# Patient Record
Sex: FEMALE | Race: WHITE | NOT HISPANIC OR LATINO | URBAN - METROPOLITAN AREA
[De-identification: names, ages, dates, MRNs, and addresses within clinical notes are randomized per-mention and may not be internally consistent; named-entity substitution may affect disease eponyms.]

---

## 2017-01-11 ENCOUNTER — INPATIENT (INPATIENT)
Facility: HOSPITAL | Age: 82
LOS: 4 days | Discharge: OTHER INSTITUTION | DRG: 563 | End: 2017-01-16
Attending: ORTHOPAEDIC SURGERY | Admitting: ORTHOPAEDIC SURGERY
Payer: MEDICARE

## 2017-01-11 VITALS
TEMPERATURE: 99 F | OXYGEN SATURATION: 98 % | DIASTOLIC BLOOD PRESSURE: 113 MMHG | SYSTOLIC BLOOD PRESSURE: 179 MMHG | HEART RATE: 98 BPM | RESPIRATION RATE: 16 BRPM

## 2017-01-11 LAB
ALBUMIN SERPL ELPH-MCNC: 3.5 G/DL — SIGNIFICANT CHANGE UP (ref 3.4–5)
ALP SERPL-CCNC: 81 U/L — SIGNIFICANT CHANGE UP (ref 40–120)
ALT FLD-CCNC: 26 U/L — SIGNIFICANT CHANGE UP (ref 12–42)
ANION GAP SERPL CALC-SCNC: 8 MMOL/L — LOW (ref 9–16)
APPEARANCE UR: CLEAR — SIGNIFICANT CHANGE UP
APTT BLD: 27.6 SEC — SIGNIFICANT CHANGE UP (ref 27.5–37.4)
AST SERPL-CCNC: 26 U/L — SIGNIFICANT CHANGE UP (ref 15–37)
BASOPHILS NFR BLD AUTO: 0.4 % — SIGNIFICANT CHANGE UP (ref 0–2)
BILIRUB SERPL-MCNC: 0.5 MG/DL — SIGNIFICANT CHANGE UP (ref 0.2–1.2)
BILIRUB UR-MCNC: (no result)
BLD GP AB SCN SERPL QL: NEGATIVE — SIGNIFICANT CHANGE UP
BLD GP AB SCN SERPL QL: NEGATIVE — SIGNIFICANT CHANGE UP
BUN SERPL-MCNC: 15 MG/DL — SIGNIFICANT CHANGE UP (ref 7–23)
CALCIUM SERPL-MCNC: 9.2 MG/DL — SIGNIFICANT CHANGE UP (ref 8.5–10.5)
CHLORIDE SERPL-SCNC: 104 MMOL/L — SIGNIFICANT CHANGE UP (ref 96–108)
CO2 SERPL-SCNC: 28 MMOL/L — SIGNIFICANT CHANGE UP (ref 22–31)
COLOR SPEC: YELLOW — SIGNIFICANT CHANGE UP
CREAT SERPL-MCNC: 0.84 MG/DL — SIGNIFICANT CHANGE UP (ref 0.5–1.3)
DIFF PNL FLD: NEGATIVE — SIGNIFICANT CHANGE UP
EOSINOPHIL NFR BLD AUTO: 0.4 % — SIGNIFICANT CHANGE UP (ref 0–6)
GLUCOSE SERPL-MCNC: 107 MG/DL — HIGH (ref 70–99)
GLUCOSE UR QL: NEGATIVE — SIGNIFICANT CHANGE UP
HCT VFR BLD CALC: 40.3 % — SIGNIFICANT CHANGE UP (ref 34.5–45)
HGB BLD-MCNC: 13.9 G/DL — SIGNIFICANT CHANGE UP (ref 11.5–15.5)
INR BLD: 0.99 — SIGNIFICANT CHANGE UP (ref 0.88–1.16)
KETONES UR-MCNC: (no result) MG/DL
LEUKOCYTE ESTERASE UR-ACNC: NEGATIVE — SIGNIFICANT CHANGE UP
LYMPHOCYTES # BLD AUTO: 9.2 % — LOW (ref 13–44)
MCHC RBC-ENTMCNC: 34.2 PG — HIGH (ref 27–34)
MCHC RBC-ENTMCNC: 34.5 G/DL — SIGNIFICANT CHANGE UP (ref 32–36)
MCV RBC AUTO: 99 FL — SIGNIFICANT CHANGE UP (ref 80–100)
MONOCYTES NFR BLD AUTO: 8.5 % — SIGNIFICANT CHANGE UP (ref 2–14)
NEUTROPHILS NFR BLD AUTO: 81.5 % — HIGH (ref 43–77)
NITRITE UR-MCNC: NEGATIVE — SIGNIFICANT CHANGE UP
PH UR: 6 — SIGNIFICANT CHANGE UP (ref 4–8)
PLATELET # BLD AUTO: 293 K/UL — SIGNIFICANT CHANGE UP (ref 150–400)
POTASSIUM SERPL-MCNC: 4.4 MMOL/L — SIGNIFICANT CHANGE UP (ref 3.5–5.3)
POTASSIUM SERPL-SCNC: 4.4 MMOL/L — SIGNIFICANT CHANGE UP (ref 3.5–5.3)
PROT SERPL-MCNC: 6.9 G/DL — SIGNIFICANT CHANGE UP (ref 6.4–8.2)
PROT UR-MCNC: NEGATIVE MG/DL — SIGNIFICANT CHANGE UP
PROTHROM AB SERPL-ACNC: 11 SEC — SIGNIFICANT CHANGE UP (ref 10–13.1)
RBC # BLD: 4.07 M/UL — SIGNIFICANT CHANGE UP (ref 3.8–5.2)
RBC # FLD: 11.5 % — SIGNIFICANT CHANGE UP (ref 10.3–16.9)
RH IG SCN BLD-IMP: NEGATIVE — SIGNIFICANT CHANGE UP
RH IG SCN BLD-IMP: NEGATIVE — SIGNIFICANT CHANGE UP
SODIUM SERPL-SCNC: 140 MMOL/L — SIGNIFICANT CHANGE UP (ref 135–145)
SP GR SPEC: 1.02 — SIGNIFICANT CHANGE UP (ref 1–1.03)
TROPONIN I SERPL-MCNC: <0.015 NG/ML — SIGNIFICANT CHANGE UP (ref 0.01–0.04)
UROBILINOGEN FLD QL: 0.2 E.U./DL — SIGNIFICANT CHANGE UP
WBC # BLD: 11 K/UL — HIGH (ref 3.8–10.5)
WBC # FLD AUTO: 11 K/UL — HIGH (ref 3.8–10.5)

## 2017-01-11 PROCEDURE — 93010 ELECTROCARDIOGRAM REPORT: CPT

## 2017-01-11 PROCEDURE — 99285 EMERGENCY DEPT VISIT HI MDM: CPT | Mod: 25

## 2017-01-11 PROCEDURE — 73140 X-RAY EXAM OF FINGER(S): CPT | Mod: 26,76,59

## 2017-01-11 PROCEDURE — 70486 CT MAXILLOFACIAL W/O DYE: CPT | Mod: 26

## 2017-01-11 PROCEDURE — 73110 X-RAY EXAM OF WRIST: CPT | Mod: 26,50

## 2017-01-11 PROCEDURE — 72125 CT NECK SPINE W/O DYE: CPT | Mod: 26

## 2017-01-11 PROCEDURE — 70450 CT HEAD/BRAIN W/O DYE: CPT | Mod: 26

## 2017-01-11 PROCEDURE — 73130 X-RAY EXAM OF HAND: CPT | Mod: 26,LT

## 2017-01-11 RX ORDER — IBUPROFEN 200 MG
600 TABLET ORAL ONCE
Qty: 0 | Refills: 0 | Status: COMPLETED | OUTPATIENT
Start: 2017-01-11 | End: 2017-01-11

## 2017-01-11 RX ORDER — TETANUS TOXOID, REDUCED DIPHTHERIA TOXOID AND ACELLULAR PERTUSSIS VACCINE, ADSORBED 5; 2.5; 8; 8; 2.5 [IU]/.5ML; [IU]/.5ML; UG/.5ML; UG/.5ML; UG/.5ML
0.5 SUSPENSION INTRAMUSCULAR ONCE
Qty: 0 | Refills: 0 | Status: COMPLETED | OUTPATIENT
Start: 2017-01-11 | End: 2017-01-11

## 2017-01-11 RX ORDER — ACETAMINOPHEN 500 MG
650 TABLET ORAL ONCE
Qty: 0 | Refills: 0 | Status: COMPLETED | OUTPATIENT
Start: 2017-01-11 | End: 2017-01-11

## 2017-01-11 RX ADMIN — Medication 650 MILLIGRAM(S): at 18:30

## 2017-01-11 RX ADMIN — Medication 600 MILLIGRAM(S): at 20:38

## 2017-01-11 RX ADMIN — Medication 650 MILLIGRAM(S): at 17:49

## 2017-01-11 RX ADMIN — TETANUS TOXOID, REDUCED DIPHTHERIA TOXOID AND ACELLULAR PERTUSSIS VACCINE, ADSORBED 0.5 MILLILITER(S): 5; 2.5; 8; 8; 2.5 SUSPENSION INTRAMUSCULAR at 19:50

## 2017-01-11 RX ADMIN — Medication 600 MILLIGRAM(S): at 19:50

## 2017-01-11 NOTE — ED PROVIDER NOTE - MEDICAL DECISION MAKING DETAILS
86yo F hx of dementia, here with bilateral non displaced distal radius fx, mandibular fx. Family at bedside, no concerns for caring for her at home if she is discharged. labs sent including troponin, ua, ekg to make sure no other reason for fall. per family, pt at baseline.

## 2017-01-11 NOTE — ED ADULT TRIAGE NOTE - CHIEF COMPLAINT QUOTE
Pt c/o pain to the chin and wrists bilaterally. Laceration to the chi approximately 2 cm in length. No active bleeding. cap refill 1 second. pain worsens

## 2017-01-11 NOTE — ED PROVIDER NOTE - OBJECTIVE STATEMENT
86yo F hx of alzheimers dementia, former alcoholism, here with complaint of fall today outside. FOOSH onto bilateral hands. Complaining of bilateral wrist pain, and L middle finger pain. Denies LOC. Hit chin and has laceration. However poor historian as after I left, pt already forgot that I had seen her.   Lives with a  who cares for her. Family not concerned about watching her after the fall.   Pts son and son in law came to bedside afterwards. Confirmed that pt is at her usual baseline. No concern for her drinking today. Deny hx of withdrawal. Do not think she is on any medications but will find out.

## 2017-01-11 NOTE — ED PROVIDER NOTE - ENMT, MLM
Airway patent, Nasal mucosa clear. Mouth with normal mucosa. Throat has no vesicles, no oropharyngeal exudates and uvula is midline. +1cm laceration to chin, no active bleeding, but gaping.

## 2017-01-12 PROCEDURE — 73110 X-RAY EXAM OF WRIST: CPT | Mod: 26,50

## 2017-01-12 RX ORDER — ACETAMINOPHEN 500 MG
650 TABLET ORAL EVERY 6 HOURS
Qty: 0 | Refills: 0 | Status: DISCONTINUED | OUTPATIENT
Start: 2017-01-12 | End: 2017-01-16

## 2017-01-12 RX ORDER — MEMANTINE HYDROCHLORIDE 10 MG/1
0 TABLET ORAL
Qty: 0 | Refills: 0 | COMMUNITY

## 2017-01-12 RX ORDER — TRAMADOL HYDROCHLORIDE 50 MG/1
50 TABLET ORAL EVERY 4 HOURS
Qty: 0 | Refills: 0 | Status: DISCONTINUED | OUTPATIENT
Start: 2017-01-12 | End: 2017-01-16

## 2017-01-12 RX ORDER — SODIUM CHLORIDE 9 MG/ML
1000 INJECTION, SOLUTION INTRAVENOUS
Qty: 0 | Refills: 0 | Status: DISCONTINUED | OUTPATIENT
Start: 2017-01-12 | End: 2017-01-16

## 2017-01-12 RX ORDER — FAMOTIDINE 10 MG/ML
20 INJECTION INTRAVENOUS EVERY 12 HOURS
Qty: 0 | Refills: 0 | Status: DISCONTINUED | OUTPATIENT
Start: 2017-01-12 | End: 2017-01-12

## 2017-01-12 RX ORDER — METHYLPHENIDATE HCL 5 MG
0 TABLET ORAL
Qty: 0 | Refills: 0 | COMMUNITY

## 2017-01-12 RX ORDER — DOCUSATE SODIUM 100 MG
100 CAPSULE ORAL
Qty: 0 | Refills: 0 | Status: DISCONTINUED | OUTPATIENT
Start: 2017-01-12 | End: 2017-01-16

## 2017-01-12 RX ADMIN — TRAMADOL HYDROCHLORIDE 50 MILLIGRAM(S): 50 TABLET ORAL at 18:30

## 2017-01-12 RX ADMIN — TRAMADOL HYDROCHLORIDE 50 MILLIGRAM(S): 50 TABLET ORAL at 11:00

## 2017-01-12 RX ADMIN — TRAMADOL HYDROCHLORIDE 50 MILLIGRAM(S): 50 TABLET ORAL at 23:07

## 2017-01-12 RX ADMIN — Medication 100 MILLIGRAM(S): at 07:52

## 2017-01-12 RX ADMIN — TRAMADOL HYDROCHLORIDE 50 MILLIGRAM(S): 50 TABLET ORAL at 10:38

## 2017-01-12 RX ADMIN — Medication 100 MILLIGRAM(S): at 18:01

## 2017-01-12 RX ADMIN — Medication 650 MILLIGRAM(S): at 08:25

## 2017-01-12 RX ADMIN — TRAMADOL HYDROCHLORIDE 50 MILLIGRAM(S): 50 TABLET ORAL at 15:00

## 2017-01-12 RX ADMIN — Medication 650 MILLIGRAM(S): at 07:52

## 2017-01-12 RX ADMIN — TRAMADOL HYDROCHLORIDE 50 MILLIGRAM(S): 50 TABLET ORAL at 14:23

## 2017-01-12 RX ADMIN — TRAMADOL HYDROCHLORIDE 50 MILLIGRAM(S): 50 TABLET ORAL at 18:01

## 2017-01-12 NOTE — DISCHARGE NOTE ADULT - PATIENT PORTAL LINK FT
“You can access the Follow[facility] Patient Portal, offered by [facility], by registering with the following website: [email or web address]”

## 2017-01-12 NOTE — DISCHARGE NOTE ADULT - CARE PROVIDERS DIRECT ADDRESSES
,DirectAddress_Unknown,[email or web address]

## 2017-01-12 NOTE — H&P ADULT. - HISTORY OF PRESENT ILLNESS
85F with h/o alzheimer's dementia s/p FOOSH onto b/l wrists. She denies any injury to her head, neck, spine. She denies any LOC/headache/blurry vision. She has no numbness/tingling in her involved extremity. She is accompanied by her family who [place] with [place]/boarder. She has moderate stage AD. She has no history of syncope or seizures.    Pt also has chin laceration as well and mandible fracture. Chin laceration was sutured by ED attending. ENT saw mandible fracture in ER and recommended outpt follow-up.

## 2017-01-12 NOTE — DISCHARGE NOTE ADULT - ADDITIONAL INSTRUCTIONS
Follow up with your primary care provider and Ear, Nose and Throat team for outpatient follow up of non-operative mandible fracture.

## 2017-01-12 NOTE — CONSULT NOTE ADULT - SUBJECTIVE AND OBJECTIVE BOX
85F with h/o alzheimer's dementia s/p FOOSH after tripping on the sidewalk onto b/l wrists. She denies any injury to her head, neck, spine. She denies any LOC/headache/blurry vision. She has no numbness/tingling in her involved extremity. She is accompanied by her family who [place] the [place] with an [place]/boarder. She has moderate stage AD. She has no history of syncope or seizures.    Pt also has chin laceration as well and mandible fracture. Chin laceration was sutured by ED attending. ENT saw mandible fracture in ER and recommended outpt follow-up.      Allergies/Medications:   Allergies:        Allergies:  	No Known Allergies:     Home Medications:   * Patient Currently Takes Medications as of 12-Jan-2017 00:42 documented in Prescription Writer  · 	donepezil: Last Dose Taken:  ,  orally   · 	memantine: Last Dose Taken:  ,  orally   · 	methylphenidate: Last Dose Taken:  ,  orally     . .    PMH/PSH/FH/SH:   Past Medical History:  Alzheimer's dementia without behavioral disturbance, unspecified timing of dementia onset.    Social History:  · Occupation	Retired	  · Lives With	alone	    Substance Use History:  · Substance Use	never used	    Alcohol Use History:  · Have you ever consumed alcohol	yes...	  · Alcohol Frequency	2-4 times/mo	  · Alcohol Amount	1-2 drinks	  · Alcohol Use Comment		    PE: Afebrile, vital signs stable  Awake, alert, comfortable, pleasant  RUE: Swelling and ecchymosis dorsal wrist  TTP over dorsal wrist, mild TTP snuffbox, no TTP fovea  Mild pain with Sabillon's maneuver, no instability  ROM: WE: 30, WF: 20  5/5 AIN/PIN/Uln  Sensation intact Med/Rad/Uln    LUE: Middle finger splinted, mild tenderness over the PIP joint  Mild TTP over dorsal distal radius, no TTP dorsal wrist, snuffbox, fovea  Wrist ROM: WE: 40, WF: 30  5/5 AIN/PIN/Uln  Sensation intact Med/Rad/Uln    XR Right wrist: Minimally displaced intra-articular distal radius fracture, Scapholunate widening, possibly chronic, has signs of Stage 2 SLAC wrist.      XR left wrist: Nondisplaced extra-articular distal radius fracture  XR left middle finger: PIP dorsal dislocation s/p reduction

## 2017-01-12 NOTE — PHYSICAL THERAPY INITIAL EVALUATION ADULT - GENERAL OBSERVATIONS, REHAB EVAL
Patient received supine in bed with bilateral wrist splints, heplock IV. Patient in no apparent distress.

## 2017-01-12 NOTE — DISCHARGE NOTE ADULT - PLAN OF CARE
Improvement after bilateral wrist fractures Right upper extremity is partial weight bearing Improvement after non-operative bilateral distal radius fractures Partial weight bearing on bilateral upper extremities.  Wear thumb spica splints and keep left 3rd finger splint intact.  No strenuous activity, heavy lifting, driving, tub bathing, or returning to work until cleared by MD.  Carmen rich.  Follow up with hand surgeon Dr. Holden on January 18th.   If you don't have a bowel movement by post op day 3, then take Milk of Magnesia (over the counter).  If no bowel movement by at least post op day 5, then use a Dulcolax suppository (over the counter) and/or a Fleets enema--if still no bowel movement, call your MD.  Contact your doctor if you experience: fever greater than 101.5, chills, chest pain, difficulty breathing, bleeding, redness or heat around the incision.

## 2017-01-12 NOTE — DISCHARGE NOTE ADULT - CARE PROVIDER_API CALL
Magi Holden), Orthopaedic Surgery  [address] [place] [address]  Phone: [phone]  Fax: [phone]

## 2017-01-12 NOTE — DISCHARGE NOTE ADULT - HOSPITAL COURSE
Admit    Pre/post-op Antibiotics  DVT prophylaxis  Physical Therapy  Pain Management Admit1/12/17 B/L Distal Radius Fracture, L 3rd PIP dislocation  DVT prophylaxis  Physical Therapy/ occupational therapy  Pain Management

## 2017-01-12 NOTE — DISCHARGE NOTE ADULT - NS MD DC FALL RISK RISK
For information on Fall & Injury Prevention, visit [email or web address]

## 2017-01-12 NOTE — PROGRESS NOTE ADULT - SUBJECTIVE AND OBJECTIVE BOX
ORTHO NOTE    [x ] Pt seen/examined.  [x ] Pt without any complaints/in NAD.    [ ] Pt complains of:      ROS: [ ] Fever  [ ] Chills  [ ] CP [ ] SOB [ ] Dysnea  [ ] Palpitations [ ] Cough [ ] N/V/C/D [ ] Paresthia [ ] Other     [x ] ROS  otherwise negative    .    PHYSICAL EXAM:    Vital Signs Last 24 Hrs  T(C): 37.4, Max: 37.4 (01-12 @ 14:54)  T(F): 99.3, Max: 99.3 (01-12 @ 14:54)  HR: 81 (73 - 86)  BP: 129/82 (126/77 - 169/93)  BP(mean): --  RR: 16 (16 - 18)  SpO2: 96% (96% - 99%)    I&O's Detail       CAPILLARY BLOOD GLUCOSE                  Neuro: baseline dementia    Lungs: CTA    CV: WNL    ABD: +BS    Ext: wrist supports to B/l hands splint to left     LABS                        13.9   11.0  )-----------( 293      ( 11 Jan 2017 20:04 )             40.3                              PT/INR - ( 11 Jan 2017 20:04 )   PT: 11.0 sec;   INR: 0.99          PTT - ( 11 Jan 2017 20:04 )  PTT:27.6 sec  11 Jan 2017 20:04    140    |  104    |  15     ----------------------------<  107    4.4     |  28     |  0.84     Ca    9.2        11 Jan 2017 20:04    TPro  6.9    /  Alb  3.5    /  TBili  0.5    /  DBili  x      /  AST  26     /  ALT  26     /  AlkPhos  81     11 Jan 2017 20:04      [ ] Other Labs  [ ] None ordered            Please check or [place] when present:  •  Heart Failure:    [ ] Acute        [ ]  Acute on Chronic        [ ] Chronic         [ ] Diastolic     [ ]  Combined    •  TRAVIS:     [ ] ATN        [ ]  Renal medullary necrosis       [ ]  Renal cortical necrosis                  [ ] Other pathological Lesion:  •  CKD:  [ ] Stage I   [ ] Stage II  [ ] Stage III    [ ]Stage IV   [ ]  CKD V   [ ]  Other/Unspecified:    •  Abdominal Nutritional Status:   [ ] Malnutrition-See Nutrition note    [ ] Cachexia   [ ]  Other        [ ] Supplement ordered:            [ ] Morbid Obesity: BMI >=40         ASSESSMENT/PLAN:      STATUS POST: b/l wrist fx    CONTINUE:          [x ] PT    [x ] DVT PPX-scds    [ x] Pain Mgt     [s ] Dispo plan- [facility]    [x ] Pt seen/examined.  [x ] Pt without any complaints/in NAD.    [ ] Pt complains of:      ROS: [ ] Fever  [ ] Chills  [ ] CP [ ] SOB [ ] Dysnea  [ ] Palpitations [ ] Cough [ ] N/V/C/D [ ] Paresthia [ ] Other     [x ] ROS  otherwise negative    .    PHYSICAL EXAM:    Vital Signs Last 24 Hrs  T(C): 37.4, Max: 37.4 (01-12 @ 14:54)  T(F): 99.3, Max: 99.3 (01-12 @ 14:54)  HR: 81 (73 - 86)  BP: 129/82 (126/77 - 169/93)  BP(mean): --  RR: 16 (16 - 18)  SpO2: 96% (96% - 99%)    I&O's Detail       CAPILLARY BLOOD GLUCOSE                  Neuro: baseline dementia    Lungs: CTA    CV: WNL    ABD: +BS    Ext: wrist supports to B/l hands splint to left middle finger    LABS                        13.9   11.0  )-----------( 293      ( 11 Jan 2017 20:04 )             40.3                              PT/INR - ( 11 Jan 2017 20:04 )   PT: 11.0 sec;   INR: 0.99          PTT - ( 11 Jan 2017 20:04 )  PTT:27.6 sec  11 Jan 2017 20:04    140    |  104    |  15     ----------------------------<  107    4.4     |  28     |  0.84     Ca    9.2        11 Jan 2017 20:04    TPro  6.9    /  Alb  3.5    /  TBili  0.5    /  DBili  x      /  AST  26     /  ALT  26     /  AlkPhos  81     11 Jan 2017 20:04      [ ] Other Labs  [ ] None ordered            Please check or [place] when present:  •  Heart Failure:    [ ] Acute        [ ]  Acute on Chronic        [ ] Chronic         [ ] Diastolic     [ ]  Combined    •  TRAVIS:     [ ] ATN        [ ]  Renal medullary necrosis       [ ]  Renal cortical necrosis                  [ ] Other pathological Lesion:  •  CKD:  [ ] Stage I   [ ] Stage II  [ ] Stage III    [ ]Stage IV   [ ]  CKD V   [ ]  Other/Unspecified:    •  Abdominal Nutritional Status:   [ ] Malnutrition-See Nutrition note    [ ] Cachexia   [ ]  Other        [ ] Supplement ordered:            [ ] Morbid Obesity: BMI >=40         ASSESSMENT/PLAN:      STATUS POST: b/l wrist fx    CONTINUE:          [x ] PT    [x ] DVT PPX-scds    [ x] Pain Mgt     [s ] Dispo plan- [facility]

## 2017-01-12 NOTE — PHYSICAL THERAPY INITIAL EVALUATION ADULT - DIAGNOSIS, PT EVAL
Patient presents with impaired bed mobility, transfers, and ambulation secondary to inability to use bilateral hands and unsteadiness.

## 2017-01-12 NOTE — H&P ADULT. - ASSESSMENT
85F RHD h/o Alzheimer's s/p B/L FOOSH p/w L 3rd PIP dislocation, B/L distal radius fractures, + R scapholunate ligament dissociation. Pt also sustained chin lac and mandible fracture (ENT consulted). No other injuries. NVID. Finger dislocation reduced with digital block and put in splint. B/L sugar tongs applied. NVID post-procedure. Non-op. Admitted for social reasons, lives alone.  1. NO plan for OR  2. Plan to admit for 85F RHD h/o Alzheimer's s/p B/L FOOSH p/w L 3rd PIP dislocation, B/L distal radius fractures, + R scapholunate ligament dissociation. Pt also sustained chin lac and mandible fracture (ENT consulted). No other injuries. NVID. Finger dislocation reduced with digital block and put in splint. B/L sugar tongs applied. NVID post-procedure. Non-op. Admitted for social reasons, lives alone.  1. NO plan for OR  2. Plan to admit for rehab v. home placement  3. pain control  4. dvt ppx  5. ENT f/u as outpatient in 1 week  6. Dr. Holden consulted f/u Dr Holden recs

## 2017-01-12 NOTE — PHYSICAL THERAPY INITIAL EVALUATION ADULT - ADDITIONAL COMMENTS
Patient states that prior to fall, she was independent with all mobility; lives alone in a one level apartment with no steps. Daughter-in-law states that patient has assistance to manage medications.

## 2017-01-12 NOTE — CONSULT NOTE ADULT - ASSESSMENT
85 year old female with Alzheimer's dementia with right distal radius fracture intra-articular with minimal displacement, SL widening possibly chronic versus new injury, left distal radius nondisplaced fracture, middle finger PIP dorsal dislocation s/p reduction  -bilateral thumb spica splints, limit weight bearing if possible  -Left middle finger splint, will convert to kandis tapes next week.  -Follow up in my office on Wednesday, 1/18/2017  -Recommend [facility] rehab to help with ADLs

## 2017-01-12 NOTE — H&P ADULT. - ADDITIONAL PE
Chin laceration now sutured  L 3rd PIP grossly deformed, ++swelling, ++ecchymosis no open wounds.   B/L wrists TTP. B/L UE: SILT M/U/R, Motor AIN/PIN/U Intact Radial 2+ Ext WWP, Brisk Cap Refill

## 2017-01-12 NOTE — PHYSICAL THERAPY INITIAL EVALUATION ADULT - PERTINENT HX OF CURRENT PROBLEM, REHAB EVAL
Patient is an 85 year old female who presents after trip and fall at home with resultant bilateral distal radius fractures and mandible fracture.

## 2017-01-12 NOTE — PHYSICAL THERAPY INITIAL EVALUATION ADULT - ACTIVE RANGE OF MOTION EXAMINATION, REHAB EVAL
bilateral  lower extremity Active ROM was WFL (within functional limits)/Able to lift bilateral shoulders to 90; elbow flexion WNL.

## 2017-01-12 NOTE — DISCHARGE NOTE ADULT - CARE PLAN
Principal Discharge DX:	Distal radius fracture  Goal:	Improvement after bilateral wrist fractures  Instructions for follow-up, activity and diet:	Right upper extremity is partial weight bearing Principal Discharge DX:	Distal radius fracture  Goal:	Improvement after non-operative bilateral distal radius fractures  Instructions for follow-up, activity and diet:	Partial weight bearing on bilateral upper extremities.  Wear thumb spica splints and keep left 3rd finger splint intact.  No strenuous activity, heavy lifting, driving, tub bathing, or returning to work until cleared by MD.  Carmen rich.  Follow up with hand surgeon Dr. Holden on January 18th.   If you don't have a bowel movement by post op day 3, then take Milk of Magnesia (over the counter).  If no bowel movement by at least post op day 5, then use a Dulcolax suppository (over the counter) and/or a Fleets enema--if still no bowel movement, call your MD.  Contact your doctor if you experience: fever greater than 101.5, chills, chest pain, difficulty breathing, bleeding, redness or heat around the incision.

## 2017-01-13 LAB
CULTURE RESULTS: NO GROWTH — SIGNIFICANT CHANGE UP
SPECIMEN SOURCE: SIGNIFICANT CHANGE UP

## 2017-01-13 RX ORDER — TRAMADOL HYDROCHLORIDE 50 MG/1
1 TABLET ORAL
Qty: 0 | Refills: 0 | COMMUNITY
Start: 2017-01-13

## 2017-01-13 RX ORDER — DOCUSATE SODIUM 100 MG
1 CAPSULE ORAL
Qty: 0 | Refills: 0 | COMMUNITY
Start: 2017-01-13

## 2017-01-13 RX ORDER — DONEPEZIL HYDROCHLORIDE 10 MG/1
10 TABLET, FILM COATED ORAL AT BEDTIME
Qty: 0 | Refills: 0 | Status: DISCONTINUED | OUTPATIENT
Start: 2017-01-13 | End: 2017-01-16

## 2017-01-13 RX ORDER — DONEPEZIL HYDROCHLORIDE 10 MG/1
1 TABLET, FILM COATED ORAL
Qty: 0 | Refills: 0 | COMMUNITY
Start: 2017-01-13

## 2017-01-13 RX ORDER — SENNA PLUS 8.6 MG/1
2 TABLET ORAL
Qty: 0 | Refills: 0 | COMMUNITY
Start: 2017-01-13

## 2017-01-13 RX ORDER — SENNA PLUS 8.6 MG/1
2 TABLET ORAL AT BEDTIME
Qty: 0 | Refills: 0 | Status: DISCONTINUED | OUTPATIENT
Start: 2017-01-13 | End: 2017-01-16

## 2017-01-13 RX ORDER — ACETAMINOPHEN 500 MG
2 TABLET ORAL
Qty: 0 | Refills: 0 | COMMUNITY
Start: 2017-01-13

## 2017-01-13 RX ORDER — MAGNESIUM HYDROXIDE 400 MG/1
30 TABLET, CHEWABLE ORAL DAILY
Qty: 0 | Refills: 0 | Status: DISCONTINUED | OUTPATIENT
Start: 2017-01-13 | End: 2017-01-16

## 2017-01-13 RX ADMIN — Medication 650 MILLIGRAM(S): at 17:15

## 2017-01-13 RX ADMIN — TRAMADOL HYDROCHLORIDE 50 MILLIGRAM(S): 50 TABLET ORAL at 00:00

## 2017-01-13 RX ADMIN — DONEPEZIL HYDROCHLORIDE 10 MILLIGRAM(S): 10 TABLET, FILM COATED ORAL at 21:27

## 2017-01-13 RX ADMIN — SENNA PLUS 2 TABLET(S): 8.6 TABLET ORAL at 21:27

## 2017-01-13 RX ADMIN — Medication 5 MILLIGRAM(S): at 21:27

## 2017-01-13 RX ADMIN — Medication 100 MILLIGRAM(S): at 17:15

## 2017-01-13 NOTE — OCCUPATIONAL THERAPY INITIAL EVALUATION ADULT - MANUAL MUSCLE TESTING RESULTS, REHAB EVAL
NWB bilateral upper extremities; bilateral shoulder/elbow > 3/5, wrist NT, digits 3-/5 bilaterally/grossly assessed due to

## 2017-01-13 NOTE — PROGRESS NOTE ADULT - SUBJECTIVE AND OBJECTIVE BOX
ORTHO NOTE    [ x] Pt seen/examined.  [x ] Pt without any complaints/in NAD.    [ ] Pt complains of:      ROS: [ ] Fever  [ ] Chills  [ ] CP [ ] SOB [ ] Dysnea  [ ] Palpitations [ ] Cough [ ] N/V/C/D [ ] Paresthia [ ] Other     [x ] ROS  otherwise negative    .    PHYSICAL EXAM:    Vital Signs Last 24 Hrs  T(C): 37.2, Max: 37.6 (01-12 @ 17:14)  T(F): 98.9, Max: 99.6 (01-12 @ 17:14)  HR: 86 (86 - 93)  BP: 109/66 (109/66 - 171/95)  BP(mean): --  RR: 17 (15 - 18)  SpO2: 98% (95% - 98%)    I&O's Detail       CAPILLARY BLOOD GLUCOSE                  Neuro: AAOX3    Lungs: CTA, IS demonstrated    CV:    ABD: soft, nontender    Ext: bilateral wrist splints in place; right hand edema > left hand edema; bilateral UE NVID; left 3rd digit with finger splint    LABS                        13.9   11.0  )-----------( 293      ( 11 Jan 2017 20:04 )             40.3                              PT/INR - ( 11 Jan 2017 20:04 )   PT: 11.0 sec;   INR: 0.99          PTT - ( 11 Jan 2017 20:04 )  PTT:27.6 sec  11 Jan 2017 20:04    140    |  104    |  15     ----------------------------<  107    4.4     |  28     |  0.84     Ca    9.2        11 Jan 2017 20:04    TPro  6.9    /  Alb  3.5    /  TBili  0.5    /  DBili  x      /  AST  26     /  ALT  26     /  AlkPhos  81     11 Jan 2017 20:04      [ ] Other Labs  [ ] None ordered            Please check or [place] when present:  •  Heart Failure:    [ ] Acute        [ ]  Acute on Chronic        [ ] Chronic         [ ] Diastolic     [ ]  Combined    •  TRAVIS:     [ ] ATN        [ ]  Renal medullary necrosis       [ ]  Renal cortical necrosis                  [ ] Other pathological Lesion:  •  CKD:  [ ] Stage I   [ ] Stage II  [ ] Stage III    [ ]Stage IV   [ ]  CKD V   [ ]  Other/Unspecified:    •  Abdominal Nutritional Status:   [ ] Malnutrition-See Nutrition note    [ ] Cachexia   [ ]  Other        [ ] Supplement ordered:            [ ] Morbid Obesity: BMI >=40         ASSESSMENT/PLAN:  non-op bilateral distal radius fractures and left 3rd PIP dislocation  Verified Alzheimers medications with her pharmacy  Planning for [facility] (needs one more night for Medicare)  Assistance with ADLs provided  CONTINUE:          [x ] OT- R UE PWB, L UE WBAT     [x ] DVT PPX- n/a    [x ] Pain Mgt- PO meds    [x ] Dispo plan- [facility]    [ x] Pt seen/examined.  [x ] Pt without any complaints/in NAD.    [ ] Pt complains of:      ROS: [ ] Fever  [ ] Chills  [ ] CP [ ] SOB [ ] Dysnea  [ ] Palpitations [ ] Cough [ ] N/V/C/D [ ] Paresthia [ ] Other     [x ] ROS  otherwise negative    .    PHYSICAL EXAM:    Vital Signs Last 24 Hrs  T(C): 37.2, Max: 37.6 (01-12 @ 17:14)  T(F): 98.9, Max: 99.6 (01-12 @ 17:14)  HR: 86 (86 - 93)  BP: 109/66 (109/66 - 171/95)  BP(mean): --  RR: 17 (15 - 18)  SpO2: 98% (95% - 98%)    I&O's Detail       CAPILLARY BLOOD GLUCOSE                  Neuro: AAOX3    Lungs: CTA, IS demonstrated    CV:    ABD: soft, nontender    Ext: bilateral wrist splints in place; right hand edema > left hand edema; bilateral UE NVID; left 3rd digit with finger splint    LABS                        13.9   11.0  )-----------( 293      ( 11 Jan 2017 20:04 )             40.3                              PT/INR - ( 11 Jan 2017 20:04 )   PT: 11.0 sec;   INR: 0.99          PTT - ( 11 Jan 2017 20:04 )  PTT:27.6 sec  11 Jan 2017 20:04    140    |  104    |  15     ----------------------------<  107    4.4     |  28     |  0.84     Ca    9.2        11 Jan 2017 20:04    TPro  6.9    /  Alb  3.5    /  TBili  0.5    /  DBili  x      /  AST  26     /  ALT  26     /  AlkPhos  81     11 Jan 2017 20:04      [ ] Other Labs  [ ] None ordered            Please check or [place] when present:  •  Heart Failure:    [ ] Acute        [ ]  Acute on Chronic        [ ] Chronic         [ ] Diastolic     [ ]  Combined    •  TRAVIS:     [ ] ATN        [ ]  Renal medullary necrosis       [ ]  Renal cortical necrosis                  [ ] Other pathological Lesion:  •  CKD:  [ ] Stage I   [ ] Stage II  [ ] Stage III    [ ]Stage IV   [ ]  CKD V   [ ]  Other/Unspecified:    •  Abdominal Nutritional Status:   [ ] Malnutrition-See Nutrition note    [ ] Cachexia   [ ]  Other        [ ] Supplement ordered:            [ ] Morbid Obesity: BMI >=40         ASSESSMENT/PLAN:  non-op bilateral distal radius fractures and left 3rd PIP dislocation  Verified Alzheimers medications with her pharmacy  Planning for [facility] (needs one more night for Medicare)  Assistance with ADLs provided  CONTINUE:          [x ] OT- R UE PWB, L UE PWB in bilateral thumb spica splints     [x ] DVT PPX- n/a    [x ] Pain Mgt- PO meds    [x ] Dispo plan- [facility]

## 2017-01-13 NOTE — OCCUPATIONAL THERAPY INITIAL EVALUATION ADULT - RANGE OF MOTION EXAMINATION, UPPER EXTREMITY
bilateral shoulder/elbow flex/ext WFL AROM, wrist NT, bilateral digits flex/ext and thumb opposition impaired; + swelling right hand, splint adjusted and patient instructed to elevate distally with pillow

## 2017-01-13 NOTE — OCCUPATIONAL THERAPY INITIAL EVALUATION ADULT - GENERAL OBSERVATIONS, REHAB EVAL
Right hand dominant. Patient cleared for Occupational Therapy by CAITIE Hurd. Patient received supine in non-acute distress, +IV heplock, + bilateral Sequential Compression Devices, + bilateral hard wrist hand splints, +bed alarm. Patient reports pain with AROM bilateral digits, however not rated by patient. Right hand dominant. Patient cleared for Occupational Therapy by CAITIE Hurd. Patient received supine in non-acute distress, +IV heplock, + bilateral Sequential Compression Devices, + bilateral hard wrist splints, +bed alarm. Patient reports pain with AROM bilateral digits, however not rated by patient.

## 2017-01-14 RX ORDER — DONEPEZIL HYDROCHLORIDE 10 MG/1
0 TABLET, FILM COATED ORAL
Qty: 0 | Refills: 0 | COMMUNITY

## 2017-01-14 RX ADMIN — Medication 100 MILLIGRAM(S): at 05:52

## 2017-01-14 RX ADMIN — Medication 100 MILLIGRAM(S): at 17:49

## 2017-01-14 RX ADMIN — Medication 5 MILLIGRAM(S): at 21:05

## 2017-01-14 RX ADMIN — SENNA PLUS 2 TABLET(S): 8.6 TABLET ORAL at 21:05

## 2017-01-14 RX ADMIN — DONEPEZIL HYDROCHLORIDE 10 MILLIGRAM(S): 10 TABLET, FILM COATED ORAL at 21:05

## 2017-01-14 NOTE — PROGRESS NOTE ADULT - SUBJECTIVE AND OBJECTIVE BOX
SUBJECTIVE: Patient seen and examined. Pt doing well o/n.  No f/c/n/v/cp/sob.     OBJECTIVE:      Affected extremity:          Splints: clean/dry/intact            Sensation: SILT         Motor exam: wiggles fingers in splint             warm well perfused; capillary refill <3 seconds     A/P :  Pt is a 86yo Female w/ distal radius fx and L 3rd PIP dislocation  -    Pain control  -    DVT ppx: SCD   -    Weight bearing status: NWB  -    Physical Therapy  -    Dispo: Rehab

## 2017-01-14 NOTE — PROGRESS NOTE ADULT - SUBJECTIVE AND OBJECTIVE BOX
SUBJECTIVE: Patient seen and examined. Pt doing well o/n.  No f/c/n/v/cp/sob.     OBJECTIVE:      Affected extremity:          Splints: clean/dry/intact            Sensation: SILT         Motor exam: wiggles fingers in splint             warm well perfused; capillary refill <3 seconds     A/P :  Pt is a 84yo Female w/ distal radius fx and L 3rd PIP dislocation  -    Pain control  -    DVT ppx: SCD   -    Weight bearing status: NWB  -    Physical Therapy  -    Dispo: Rehab

## 2017-01-15 RX ADMIN — Medication 100 MILLIGRAM(S): at 05:21

## 2017-01-15 RX ADMIN — DONEPEZIL HYDROCHLORIDE 10 MILLIGRAM(S): 10 TABLET, FILM COATED ORAL at 22:30

## 2017-01-16 VITALS
RESPIRATION RATE: 16 BRPM | DIASTOLIC BLOOD PRESSURE: 76 MMHG | SYSTOLIC BLOOD PRESSURE: 126 MMHG | HEART RATE: 66 BPM | OXYGEN SATURATION: 98 %

## 2017-01-16 PROCEDURE — 86901 BLOOD TYPING SEROLOGIC RH(D): CPT

## 2017-01-16 PROCEDURE — 97116 GAIT TRAINING THERAPY: CPT

## 2017-01-16 PROCEDURE — 97001: CPT

## 2017-01-16 PROCEDURE — 90471 IMMUNIZATION ADMIN: CPT

## 2017-01-16 PROCEDURE — 73140 X-RAY EXAM OF FINGER(S): CPT

## 2017-01-16 PROCEDURE — 84484 ASSAY OF TROPONIN QUANT: CPT

## 2017-01-16 PROCEDURE — 90715 TDAP VACCINE 7 YRS/> IM: CPT

## 2017-01-16 PROCEDURE — 99285 EMERGENCY DEPT VISIT HI MDM: CPT | Mod: 25

## 2017-01-16 PROCEDURE — 86900 BLOOD TYPING SEROLOGIC ABO: CPT

## 2017-01-16 PROCEDURE — 93005 ELECTROCARDIOGRAM TRACING: CPT

## 2017-01-16 PROCEDURE — 87086 URINE CULTURE/COLONY COUNT: CPT

## 2017-01-16 PROCEDURE — 85730 THROMBOPLASTIN TIME PARTIAL: CPT

## 2017-01-16 PROCEDURE — 80053 COMPREHEN METABOLIC PANEL: CPT

## 2017-01-16 PROCEDURE — 85610 PROTHROMBIN TIME: CPT

## 2017-01-16 PROCEDURE — 36415 COLL VENOUS BLD VENIPUNCTURE: CPT

## 2017-01-16 PROCEDURE — 85025 COMPLETE CBC W/AUTO DIFF WBC: CPT

## 2017-01-16 PROCEDURE — 70486 CT MAXILLOFACIAL W/O DYE: CPT

## 2017-01-16 PROCEDURE — 81003 URINALYSIS AUTO W/O SCOPE: CPT

## 2017-01-16 PROCEDURE — 70450 CT HEAD/BRAIN W/O DYE: CPT

## 2017-01-16 PROCEDURE — 73110 X-RAY EXAM OF WRIST: CPT

## 2017-01-16 PROCEDURE — 72125 CT NECK SPINE W/O DYE: CPT

## 2017-01-16 PROCEDURE — 73130 X-RAY EXAM OF HAND: CPT

## 2017-01-16 PROCEDURE — 86850 RBC ANTIBODY SCREEN: CPT

## 2017-01-16 RX ADMIN — Medication 100 MILLIGRAM(S): at 05:46

## 2017-01-16 NOTE — PROGRESS NOTE ADULT - ASSESSMENT
85F B/L distal radius fractures, L 3rd PIP dislocation  -Pain Control  -PT   -DVT PPX SCD  -Dispo Planning

## 2017-01-16 NOTE — PROGRESS NOTE ADULT - SUBJECTIVE AND OBJECTIVE BOX
No events    T(F): 995.8, Max: 995.8 (01-16 @ 04:57)  HR: 66 (66 - 85)  BP: 126/76 (105/64 - 126/76)  RR: 16 (16 - 16)  SpO2: 98% (97% - 99%)  Wt(kg): --    I & Os for current day (as of 01-16 @ 07:13)  =============================================  IN:    Total IN: 0 ml  ---------------------------------------------  OUT:    Voided: 350 ml    Total OUT: 350 ml  ---------------------------------------------  Total NET: -350 ml    PE: B/L Splints CDI  SILT M/U/R  Motor AIN/PIN/U Intact  Radial 2+  Ext WWP, Brisk Cap Refill

## 2017-01-19 DIAGNOSIS — F02.80 DEMENTIA IN OTHER DISEASES CLASSIFIED ELSEWHERE, UNSPECIFIED SEVERITY, WITHOUT BEHAVIORAL DISTURBANCE, PSYCHOTIC DISTURBANCE, MOOD DISTURBANCE, AND ANXIETY: ICD-10-CM

## 2017-01-19 DIAGNOSIS — S52.501A UNSPECIFIED FRACTURE OF THE LOWER END OF RIGHT RADIUS, INITIAL ENCOUNTER FOR CLOSED FRACTURE: ICD-10-CM

## 2017-01-19 DIAGNOSIS — S06.9X0A UNSPECIFIED INTRACRANIAL INJURY WITHOUT LOSS OF CONSCIOUSNESS, INITIAL ENCOUNTER: ICD-10-CM
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
## 2017-01-19 DIAGNOSIS — Z91.81 HISTORY OF FALLING: ICD-10-CM

## 2017-01-19 DIAGNOSIS — F10.21 ALCOHOL DEPENDENCE, IN REMISSION: ICD-10-CM

## 2017-01-19 DIAGNOSIS — G30.9 ALZHEIMER'S DISEASE, UNSPECIFIED: ICD-10-CM

## 2017-01-19 DIAGNOSIS — S01.81XA LACERATION WITHOUT FOREIGN BODY OF OTHER PART OF HEAD, INITIAL ENCOUNTER: ICD-10-CM

## 2017-01-19 DIAGNOSIS — S02.609A FRACTURE OF MANDIBLE, UNSPECIFIED, INITIAL ENCOUNTER FOR CLOSED FRACTURE: ICD-10-CM

## 2017-01-19 DIAGNOSIS — S63.275A: ICD-10-CM

## 2017-01-19 DIAGNOSIS — S52.502A UNSPECIFIED FRACTURE OF THE LOWER END OF LEFT RADIUS, INITIAL ENCOUNTER FOR CLOSED FRACTURE: ICD-10-CM
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
## 2017-01-19 DIAGNOSIS — S63.094A OTHER DISLOCATION OF RIGHT WRIST AND HAND, INITIAL ENCOUNTER: ICD-10-CM
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
## 2019-11-18 NOTE — OCCUPATIONAL THERAPY INITIAL EVALUATION ADULT - PLANNED THERAPY INTERVENTIONS, OT EVAL
ADL retraining/IADL retraining/transfer training/strengthening/bed mobility training/orthotic fitting/training/ROM Xeljanz Counseling: I discussed with the patient the risks of Xeljanz therapy including increased risk of infection, liver issues, headache, diarrhea, or cold symptoms. Live vaccines should be avoided. They were instructed to call if they have any problems.

## 2022-04-21 NOTE — DISCHARGE NOTE ADULT - MEDICATION SUMMARY - MEDICATIONS TO TAKE
No
I will START or STAY ON the medications listed below when I get home from the hospital:    acetaminophen 325 mg oral tablet  -- 2 tab(s) by mouth every 6 hours, As needed, Mild Pain (1 - 3)  -- Indication: For pain    traMADol 50 mg oral tablet  -- 1 tab(s) by mouth every 4 hours, As needed, Moderate Pain (4 - 6)  -- Indication: For prn pain    donepezil 10 mg oral tablet  -- 1 tab(s) by mouth once a day (at bedtime)  -- Indication: For home    methylphenidate  --  by mouth   -- Indication: For home    bisacodyl 5 mg oral delayed release tablet  -- 1 tab(s) by mouth once a day (at bedtime)  -- Indication: For home    docusate sodium 100 mg oral capsule  -- 1 cap(s) by mouth 2 times a day  -- Indication: For home    senna oral tablet  -- 2 tab(s) by mouth once a day (at bedtime)  -- Indication: For home    memantine  --  by mouth   -- Indication: For home

## 2022-09-07 NOTE — H&P ADULT. - NSSUBSTANCEUSE_GEN_ALL_CORE_SD
Pt. Called the office. States that she is changing insurance, switching to [facility]. Procedure and follow up cancelled. Will call back to reschedule when Insurance is active. never used

## 2023-01-31 NOTE — PHYSICAL THERAPY INITIAL EVALUATION ADULT - GAIT PATTERN USED, PT EVAL
Patient ID: Ana Jenkins is a 73 year old female.    Chief Complaint   Patient presents with   • Sinus Problem     Pressure on right side of the face  Onset :1/22/23  Pt used : Tylenol reg strength 2 tabs  ( last dose was last night around 1 am )    • Headache     Onset : on and off since 1/22/23   • Coronavirus Vaccine     YES - pt had 5 dossese ( per pt )   2 doses-Pfizer ( documented)   2 doses- Moderna (documented )   • Other     Flu vaccine:YES (10/14/22)  Covid exposure in the past 14 days:NO  Covid testing in the past 90 days:NO  Traveling in the past 30 days:NO  Letter for school/work that pt was evaluated in [facility]:NO         Sx's started x 9 days ago w/ R sided sinus pain/pressure,and nasal congestion; taken OTC meds w/ some relief  No home COVID test  Eating and drinking ok  Pt also c/o ear discomfort; seen an ENT  No change in activity  Denies dyspnea, abd pain, HA, dizziness, fever, body aches/chills, visual disturbances, gait imbalance, or n/v  Pt does not appear to be in any distress at this time      ALLERGIES:  ALLERGIES:   Allergen Reactions   • Contrast Media Other (See Comments)   • Aspirin Other (See Comments)     Upset stomach       IMMUNIZATIONS:  Immunization History   Administered Date(s) Administered   • COVID Moderna 0.5 mL 12Y+ 01/04/2022, 06/29/2022   • COVID Pfizer 12Y+ (Requires Dilution) 03/14/2021, 04/04/2021   • Influenz, recombinant quadrivalent, egg-free, preserve-free (FLUBLOK) 10/11/2020   • Influenza, High Dose quadrivalent, preserve-free 09/09/2021, 10/14/2022   • Influenza, Split 11/01/2012   • Influenza, high dose seasonal, preservative-free 09/27/2018, 10/30/2019   • Pneumococcal Conjugate 13 Valent Vacc (Prevnar 13) 03/01/2015   • Pneumococcal Polysaccharide Vacc (Pneumovax 23) 03/27/2016   • Shingrix (Shingles Zoster) 09/25/2020, 12/19/2020   • Tdap 02/25/2016       MEDICAL HISTORY:  Past Medical History:   Diagnosis Date   • Arthritis    • Diabetes mellitus (CMS/[facility])     • GERD (gastroesophageal reflux disease)    • HA (headache)    • Hypercholesterolemia    • Hypertension    • Ischemic optic neuropathy of left eye    • Personal history of COVID-19 11/2020   • Sensorineural hearing loss    • 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  Negative for activity change, chills, fatigue and fever.   HENT: Positive for congestion, ear pain, postnasal drip, sinus pressure and sinus pain. Negative for ear discharge, sore throat, trouble swallowing and voice change.    Eyes: Negative.    Respiratory: Negative.  Negative for cough, chest tightness, shortness of breath, wheezing and stridor.    Cardiovascular: Negative.  Negative for chest pain, palpitations and leg swelling.   Gastrointestinal: Negative.  Negative for abdominal distention, nausea and vomiting.   Endocrine: Negative.    Genitourinary: Negative.    Musculoskeletal: Negative.    Skin: Negative.  Negative for color change and rash.   Allergic/Immunologic: Negative.    Neurological: Negative.  Negative for dizziness, weakness and headaches.   Hematological: Negative.    Psychiatric/Behavioral: Negative.    All other systems reviewed and are negative.       Physical Exam  Vitals and nursing note reviewed.   Constitutional:       Appearance: Normal appearance.   HENT:      Right Ear: Hearing, ear canal and external ear normal. A middle ear effusion is present.      Left Ear: Hearing, ear canal and external ear normal. A middle ear effusion is present.      Nose: Congestion present. No rhinorrhea.      Right Sinus: Maxillary sinus tenderness and frontal sinus tenderness present.      Mouth/Throat:      Mouth: Mucous membranes are moist.       Pharynx: Oropharynx is clear. No posterior oropharyngeal erythema.      Neck: Normal range of motion.   Eyes:      Extraocular Movements: Extraocular movements intact.      Conjunctiva/sclera: Conjunctivae normal.      Pupils: Pupils are equal, round, and reactive to light.   Cardiovascular:      Rate and Rhythm: Normal rate.      Heart sounds: Normal heart sounds.   Pulmonary:      Effort: Pulmonary effort is normal. No respiratory distress.      Breath sounds: Normal breath sounds. No stridor. No wheezing, rhonchi or rales.   Chest:      Chest wall: No tenderness.   Abdominal:      General: Bowel sounds are normal.      Palpations: Abdomen is soft.   Musculoskeletal:         General: Normal range of motion.   Skin:     General: Skin is warm and dry.      Capillary Refill: Capillary refill takes less than 2 seconds.   Neurological:      Mental Status: She is alert and oriented to person, place, and time.         Vitals:    01/31/23 1122   BP: 127/71   BP Location: LUE - Left upper extremity   Patient Position: Sitting   Cuff Size: Large Adult   Pulse: 70   Temp: 98.1 °F (36.7 °C)   TempSrc: Oral   SpO2: 96%   Weight: 71.5 kg (157 lb 8.3 oz)   Height: 5' 2\" (1.575 m)       VISIT DIAGNOSIS:  Ana was seen today for sinus problem, headache, coronavirus vaccine and other.    Diagnoses and all orders for this visit:    Acute URI  -     POCT COVID/FLU/RSV PANEL    Acute non-recurrent maxillary sinusitis    Fluid level behind tympanic membrane of both ears    Other orders  -     amoxicillin-clavulanate (AUGMENTIN) 875-125 MG per tablet; Take 1 tablet by mouth 2 times daily for 10 days.        Results for orders placed or performed in visit on 01/31/23   POCT COVID/FLU/RSV PANEL   Result Value    POCT Rapid SARS-COV-2 by PCR Not Detected    POCT Influenza A by PCR Not Detected    POCT Influenza B By PCR Not Detected    RSV By PCR Not Detected       Medical decision/plan  - Pt was seen and examined  - Above results d/w  patient  - Supportive care, push fluid and stay hydrated  - Encouraged pt to take prescribed medications as directed; given proper dosing instructions and s/e  - Discussed for possible ER precautions related to symptoms; verbalizes understanding  - If symptoms do not resolve/improve follow up with primary care physician as discussed  - Pt was discharged home w/ AVS summary  - Verbalizes understanding of plan,  all questions answered and no further questions at time of discharge            *The plan was discussed verbally and key components were summarized in the discharge instructions and printed on the AVS. All questions were answered. In discussing management and follow-up, they are advised that the plan is based only on information available at the time of this evaluation. Additional testing may be necessary, and outpatient evaluation is frequently required to ensure complete care. At the same time, there is always potential for symptoms to recur or worsen, or for additional signs or symptoms to develop that could substantially affect the treatment plan. If any new or uncontrolled symptoms occur, or if there are any other concerns, they are advised to seek medical evaluation immediately.     swing-through gait

## 2023-02-24 NOTE — DISCHARGE NOTE ADULT - COMMUNITY RESOURCES
cosopt (dorzolomide/timolol) one drop twice a day in both sides.  xalatan (latanoprost) one drop once a day in both eyes.    Follow up in 4 months.   
[facility]

## 2024-06-19 NOTE — PHYSICAL THERAPY INITIAL EVALUATION ADULT - PATIENT/FAMILY AGREES WITH PLAN
recommendation rehab/yes Detail Level: Zone Render In Strict Bullet Format?: No Initiate Treatment: Begin Clobetasol solution, applying bid to aa for up to 2 weeks monthly.

## 2025-01-14 NOTE — DISCHARGE NOTE ADULT - INSTRUCTIONS
Wasn't able to get a hold of patient, lvm. Called to make some f/u appts with us and informed her of self discharge if she felt that was a better option. Patient hasn't been seen since 12/11    Regular diet